# Patient Record
Sex: FEMALE | Race: WHITE | NOT HISPANIC OR LATINO | Employment: FULL TIME | URBAN - METROPOLITAN AREA
[De-identification: names, ages, dates, MRNs, and addresses within clinical notes are randomized per-mention and may not be internally consistent; named-entity substitution may affect disease eponyms.]

---

## 2023-07-18 ENCOUNTER — OFFICE VISIT (OUTPATIENT)
Dept: URGENT CARE | Facility: CLINIC | Age: 23
End: 2023-07-18
Payer: COMMERCIAL

## 2023-07-18 VITALS
BODY MASS INDEX: 28.88 KG/M2 | WEIGHT: 195 LBS | RESPIRATION RATE: 16 BRPM | TEMPERATURE: 99 F | DIASTOLIC BLOOD PRESSURE: 66 MMHG | HEART RATE: 71 BPM | OXYGEN SATURATION: 97 % | HEIGHT: 69 IN | SYSTOLIC BLOOD PRESSURE: 99 MMHG

## 2023-07-18 DIAGNOSIS — S50.861A INSECT BITE OF RIGHT FOREARM, INITIAL ENCOUNTER: Primary | ICD-10-CM
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
## 2023-07-18 PROCEDURE — 99203 OFFICE O/P NEW LOW 30 MIN: CPT | Mod: S$GLB,,, | Performed by: NURSE PRACTITIONER

## 2023-07-18 PROCEDURE — 99203 PR OFFICE/OUTPT VISIT, NEW, LEVL III, 30-44 MIN: ICD-10-PCS | Mod: S$GLB,,, | Performed by: NURSE PRACTITIONER

## 2023-07-18 RX ORDER — ESCITALOPRAM OXALATE 10 MG/1
20 TABLET ORAL DAILY
COMMUNITY

## 2023-07-18 RX ORDER — BUPROPION HYDROCHLORIDE 100 MG/1
TABLET ORAL
COMMUNITY

## 2023-07-18 RX ORDER — BUPROPION HYDROCHLORIDE 150 MG/1
TABLET ORAL
COMMUNITY
Start: 2023-04-22

## 2023-07-18 RX ORDER — MUPIROCIN 20 MG/G
OINTMENT TOPICAL
Qty: 22 G | Refills: 1 | Status: SHIPPED | OUTPATIENT
Start: 2023-07-18

## 2023-07-18 RX ORDER — SULFAMETHOXAZOLE AND TRIMETHOPRIM 800; 160 MG/1; MG/1
1 TABLET ORAL 2 TIMES DAILY
Qty: 20 TABLET | Refills: 0 | Status: SHIPPED | OUTPATIENT
Start: 2023-07-18 | End: 2023-07-28

## 2023-07-18 RX ORDER — BUPROPION HYDROCHLORIDE 150 MG/1
150 TABLET ORAL
COMMUNITY
Start: 2023-07-07

## 2023-07-18 NOTE — PROGRESS NOTES
"Subjective:      Patient ID: Deepa Robins is a 22 y.o. female.    Vitals:  height is 5' 9" (1.753 m) and weight is 88.5 kg (195 lb). Her oral temperature is 98.7 °F (37.1 °C). Her blood pressure is 99/66 and her pulse is 71. Her respiration is 16 and oxygen saturation is 97%.     Chief Complaint: Insect Bite (*unsure what bit her on RIGHT arm)    Pt is coming in today for a possible insect bite she is unsure what bit her this morning. Noticeable redness/swelling/itchness and a small bump on right forearm . She is not sure if she is UTD on her tetanus. She did apply topical antibiotic ointment today.    Insect Bite  This is a new problem. The current episode started today. The problem occurs intermittently. The problem has been gradually worsening. Pertinent negatives include no chills, fatigue, fever or rash. Nothing aggravates the symptoms. Treatments tried: antibiotic ointment. The treatment provided no relief.     Constitution: Negative for chills, fatigue and fever.   Musculoskeletal:  Negative for pain.   Skin:  Positive for color change and erythema. Negative for rash.   Allergic/Immunologic: Positive for itching.    Objective:     Physical Exam   Constitutional: She is oriented to person, place, and time. She appears well-developed.  Non-toxic appearance. She does not appear ill. No distress.   HENT:   Head: Normocephalic and atraumatic. Head is without abrasion, without contusion and without laceration.   Ears:   Right Ear: External ear normal.   Left Ear: External ear normal.   Nose: Nose normal.   Mouth/Throat: Oropharynx is clear and moist and mucous membranes are normal.   Eyes: Conjunctivae, EOM and lids are normal. Pupils are equal, round, and reactive to light.   Neck: Trachea normal and phonation normal. Neck supple.   Cardiovascular: Normal rate, regular rhythm and normal heart sounds.   Pulmonary/Chest: Effort normal and breath sounds normal. No stridor. No respiratory distress.   Musculoskeletal: " Normal range of motion.         General: Normal range of motion.   Neurological: She is alert and oriented to person, place, and time.   Skin: Skin is warm, dry, intact, not diaphoretic, no rash and no abscessed. Capillary refill takes less than 2 seconds. erythema and lesion (there is a 2cm round area of induration with a red streak extending into forearm) No abrasion, No burn, No bruising and No ecchymosis   Psychiatric: Her speech is normal and behavior is normal. Judgment and thought content normal.   Nursing note and vitals reviewed.      Assessment:     1. Insect bite of right forearm, initial encounter        Plan:       Insect bite of right forearm, initial encounter  -     mupirocin (BACTROBAN) 2 % ointment; Apply to affected area 3 times daily  Dispense: 22 g; Refill: 1  -     sulfamethoxazole-trimethoprim 800-160mg (BACTRIM DS) 800-160 mg Tab; Take 1 tablet by mouth 2 (two) times daily. for 10 days  Dispense: 20 tablet; Refill: 0      Patient Instructions                                 If your condition worsens or fails to improve we recommend that you receive another evaluation at the ER immediately or contact your PCP to discuss your concerns or return here. You must understand that you've received an urgent care treatment only and that you may be released before all your medical problems are known or treated. You the patient will arrange for follouwp care as instructed.   Use warm compresses for 20 minutes 3-5 times a day. Avoid picking or manipulating the wound. Clean the wound twice a day and put the antibiotic ointment on it. You should seek ER treatment if fever, increase pain, swelling or other signs of increasing infection.   If you are female and on BCP use additional methods to prevent pregnancy while on antibiotics and for one cycle after.   If you were given narcotics do not drive or operate heavy equipment or machinery while taking these medications.   Tylenol or ibuprofen can also be used  as directed for pain unless you have an allergy to them or medical condition such as stomach ulcers, kidney or liver disease or blood thinners etc for which you should not be taking these type of medications.

## 2023-07-18 NOTE — PATIENT INSTRUCTIONS
If your condition worsens or fails to improve we recommend that you receive another evaluation at the ER immediately or contact your PCP to discuss your concerns or return here. You must understand that you've received an urgent care treatment only and that you may be released before all your medical problems are known or treated. You the patient will arrange for follouwp care as instructed.   Use warm compresses for 20 minutes 3-5 times a day. Avoid picking or manipulating the wound. Clean the wound twice a day and put the antibiotic ointment on it. You should seek ER treatment if fever, increase pain, swelling or other signs of increasing infection.   If you are female and on BCP use additional methods to prevent pregnancy while on antibiotics and for one cycle after.   If you were given narcotics do not drive or operate heavy equipment or machinery while taking these medications.   Tylenol or ibuprofen can also be used as directed for pain unless you have an allergy to them or medical condition such as stomach ulcers, kidney or liver disease or blood thinners etc for which you should not be taking these type of medications.

## 2024-08-07 ENCOUNTER — OFFICE VISIT (OUTPATIENT)
Dept: INTERNAL MEDICINE | Facility: CLINIC | Age: 24
End: 2024-08-07
Attending: INTERNAL MEDICINE
Payer: COMMERCIAL

## 2024-08-07 VITALS
DIASTOLIC BLOOD PRESSURE: 80 MMHG | BODY MASS INDEX: 30.3 KG/M2 | SYSTOLIC BLOOD PRESSURE: 110 MMHG | HEART RATE: 102 BPM | TEMPERATURE: 98 F | WEIGHT: 204.56 LBS | HEIGHT: 69 IN | OXYGEN SATURATION: 98 %

## 2024-08-07 DIAGNOSIS — R05.9 COUGH, UNSPECIFIED TYPE: Primary | ICD-10-CM

## 2024-08-07 PROCEDURE — 99203 OFFICE O/P NEW LOW 30 MIN: CPT | Mod: S$GLB,,, | Performed by: INTERNAL MEDICINE

## 2024-08-07 PROCEDURE — 1160F RVW MEDS BY RX/DR IN RCRD: CPT | Mod: CPTII,S$GLB,, | Performed by: INTERNAL MEDICINE

## 2024-08-07 PROCEDURE — 3008F BODY MASS INDEX DOCD: CPT | Mod: CPTII,S$GLB,, | Performed by: INTERNAL MEDICINE

## 2024-08-07 PROCEDURE — 3074F SYST BP LT 130 MM HG: CPT | Mod: CPTII,S$GLB,, | Performed by: INTERNAL MEDICINE

## 2024-08-07 PROCEDURE — 1159F MED LIST DOCD IN RCRD: CPT | Mod: CPTII,S$GLB,, | Performed by: INTERNAL MEDICINE

## 2024-08-07 PROCEDURE — 99999 PR PBB SHADOW E&M-EST. PATIENT-LVL IV: CPT | Mod: PBBFAC,,, | Performed by: INTERNAL MEDICINE

## 2024-08-07 PROCEDURE — 3079F DIAST BP 80-89 MM HG: CPT | Mod: CPTII,S$GLB,, | Performed by: INTERNAL MEDICINE

## 2024-08-07 RX ORDER — BUPROPION HYDROCHLORIDE 300 MG/1
300 TABLET ORAL DAILY
COMMUNITY

## 2024-08-07 RX ORDER — LAMOTRIGINE 25 MG/1
25 TABLET ORAL 2 TIMES DAILY
COMMUNITY
Start: 2024-06-08